# Patient Record
Sex: FEMALE | Race: WHITE | NOT HISPANIC OR LATINO | URBAN - METROPOLITAN AREA
[De-identification: names, ages, dates, MRNs, and addresses within clinical notes are randomized per-mention and may not be internally consistent; named-entity substitution may affect disease eponyms.]

---

## 2020-07-14 ENCOUNTER — OFFICE VISIT (OUTPATIENT)
Dept: URBAN - METROPOLITAN AREA CLINIC 113 | Facility: CLINIC | Age: 60
End: 2020-07-14

## 2020-07-25 ENCOUNTER — TELEPHONE ENCOUNTER (OUTPATIENT)
Dept: URBAN - METROPOLITAN AREA CLINIC 13 | Facility: CLINIC | Age: 60
End: 2020-07-25

## 2020-07-25 RX ORDER — PANTOPRAZOLE SODIUM 40 MG
TAKE 1 TABLET DAILY TABLET, DELAYED RELEASE (ENTERIC COATED) ORAL
Refills: 0 | OUTPATIENT
End: 2014-04-30

## 2020-07-26 ENCOUNTER — TELEPHONE ENCOUNTER (OUTPATIENT)
Dept: URBAN - METROPOLITAN AREA CLINIC 13 | Facility: CLINIC | Age: 60
End: 2020-07-26

## 2020-07-26 RX ORDER — DICLOFENAC SODIUM 100 MG/1
TABLET, EXTENDED RELEASE ORAL
Qty: 30 | Refills: 0 | Status: ACTIVE | COMMUNITY
Start: 2012-06-13

## 2020-07-26 RX ORDER — PANTOPRAZOLE SODIUM 40 MG/1
TABLET, DELAYED RELEASE ORAL
Qty: 30 | Refills: 0 | Status: ACTIVE | COMMUNITY
Start: 2012-08-28

## 2020-07-26 RX ORDER — CYCLOSPORINE 0.5 MG/ML
INSTILL 1 DROP IN EACH EYE TWICE DAILY EMULSION OPHTHALMIC
Refills: 0 | Status: ACTIVE | COMMUNITY

## 2020-07-26 RX ORDER — METOPROLOL SUCCINATE 25 MG/1
TABLET, EXTENDED RELEASE ORAL
Qty: 30 | Refills: 0 | Status: ACTIVE | COMMUNITY
Start: 2012-08-30

## 2020-07-26 RX ORDER — LOSARTAN POTASSIUM AND HYDROCHLOROTHIAZIDE 50; 12.5 MG/1; MG/1
TAKE 1 TABLET DAILY TABLET, FILM COATED ORAL
Refills: 0 | Status: ACTIVE | COMMUNITY
Start: 2019-08-05

## 2020-07-26 RX ORDER — PANTOPRAZOLE SODIUM 40 MG/1
TAKE 1 TABLET TWICE DAILY TABLET, DELAYED RELEASE ORAL
Qty: 180 | Refills: 3 | Status: ACTIVE | COMMUNITY
Start: 2013-07-03

## 2020-07-26 RX ORDER — VALACYCLOVIR HCL 500 MG
TAKE 1 TABLET  PRN TABLET ORAL
Refills: 0 | Status: ACTIVE | COMMUNITY

## 2020-07-26 RX ORDER — SODIUM SULFATE, POTASSIUM SULFATE, MAGNESIUM SULFATE 17.5; 3.13; 1.6 G/ML; G/ML; G/ML
5PM THE DAY BEFORE PROCEDURE, DRINK 1/2 OF PREP, THEN 6HOURS BEFORE PROCEDURE DRINK REMAINDER OF PREP SOLUTION, CONCENTRATE ORAL
Qty: 1 | Refills: 0 | Status: ACTIVE | COMMUNITY
Start: 2020-07-14

## 2020-07-26 RX ORDER — DESOXIMETASONE 2.5 MG/G
OINTMENT TOPICAL
Qty: 60 | Refills: 0 | Status: ACTIVE | COMMUNITY
Start: 2012-07-31

## 2020-07-26 RX ORDER — CARBINOXAMINE MALEATE 4 MG/1
TABLET ORAL
Qty: 30 | Refills: 0 | Status: ACTIVE | COMMUNITY
Start: 2013-02-12

## 2020-07-26 RX ORDER — LOSARTAN POTASSIUM 50 MG/1
TABLET, FILM COATED ORAL
Qty: 30 | Refills: 0 | Status: ACTIVE | COMMUNITY
Start: 2019-08-30

## 2020-07-26 RX ORDER — DOXYCYCLINE 100 MG/1
CAPSULE ORAL
Qty: 14 | Refills: 0 | Status: ACTIVE | COMMUNITY
Start: 2019-12-29

## 2020-07-26 RX ORDER — CYCLOSPORINE 0.5 MG/ML
EMULSION OPHTHALMIC
Qty: 60 | Refills: 0 | Status: ACTIVE | COMMUNITY
Start: 2012-06-13

## 2020-07-26 RX ORDER — PREDNISONE 20 MG/1
TABLET ORAL
Qty: 10 | Refills: 0 | Status: ACTIVE | COMMUNITY
Start: 2019-12-29

## 2020-07-26 RX ORDER — CIPROFLOXACIN HYDROCHLORIDE 500 MG/1
TABLET, FILM COATED ORAL
Qty: 20 | Refills: 0 | Status: ACTIVE | COMMUNITY
Start: 2019-12-06

## 2020-07-26 RX ORDER — METOPROLOL SUCCINATE 25 MG/1
TAKE 1 TABLET DAILY TABLET, EXTENDED RELEASE ORAL
Refills: 0 | Status: ACTIVE | COMMUNITY

## 2020-07-26 RX ORDER — SALICYLIC ACID 60 MG/G
CREAM TOPICAL
Qty: 255 | Refills: 0 | Status: ACTIVE | COMMUNITY
Start: 2012-07-30

## 2020-07-26 RX ORDER — EMOLLIENT COMBINATION NO.53
CREAM (GRAM) TOPICAL
Qty: 255 | Refills: 0 | Status: ACTIVE | COMMUNITY
Start: 2012-07-30

## 2020-07-26 RX ORDER — VALACYCLOVIR 1 G/1
TABLET, FILM COATED ORAL
Qty: 30 | Refills: 0 | Status: ACTIVE | COMMUNITY
Start: 2020-06-23

## 2020-07-26 RX ORDER — HYDROCHLOROTHIAZIDE 12.5 MG/1
TABLET ORAL
Qty: 30 | Refills: 0 | Status: ACTIVE | COMMUNITY
Start: 2019-08-30

## 2020-07-26 RX ORDER — BENZONATATE 200 MG/1
CAPSULE ORAL
Qty: 20 | Refills: 0 | Status: ACTIVE | COMMUNITY
Start: 2019-12-29

## 2020-07-26 RX ORDER — SULFAMETHOXAZOLE AND TRIMETHOPRIM 800; 160 MG/1; MG/1
TABLET ORAL
Qty: 6 | Refills: 0 | Status: ACTIVE | COMMUNITY
Start: 2019-11-24

## 2020-07-26 RX ORDER — MELOXICAM 15 MG/1
TABLET ORAL
Qty: 30 | Refills: 0 | Status: ACTIVE | COMMUNITY
Start: 2020-04-14

## 2020-08-03 ENCOUNTER — OFFICE VISIT (OUTPATIENT)
Dept: URBAN - METROPOLITAN AREA SURGERY CENTER 25 | Facility: SURGERY CENTER | Age: 60
End: 2020-08-03
Payer: COMMERCIAL

## 2020-08-03 ENCOUNTER — CLAIMS CREATED FROM THE CLAIM WINDOW (OUTPATIENT)
Dept: URBAN - METROPOLITAN AREA CLINIC 4 | Facility: CLINIC | Age: 60
End: 2020-08-03
Payer: COMMERCIAL

## 2020-08-03 DIAGNOSIS — K22.8 OTHER SPECIFIED DISEASES OF ESOPHAGUS: ICD-10-CM

## 2020-08-03 DIAGNOSIS — K44.9 HIATAL HERNIA: ICD-10-CM

## 2020-08-03 DIAGNOSIS — R13.10 DYSPHAGIA: ICD-10-CM

## 2020-08-03 DIAGNOSIS — K21.9 GERD: ICD-10-CM

## 2020-08-03 PROCEDURE — 43248 EGD GUIDE WIRE INSERTION: CPT | Performed by: INTERNAL MEDICINE

## 2020-08-03 PROCEDURE — 43239 EGD BIOPSY SINGLE/MULTIPLE: CPT | Performed by: INTERNAL MEDICINE

## 2020-08-03 PROCEDURE — 88305 TISSUE EXAM BY PATHOLOGIST: CPT | Performed by: PATHOLOGY

## 2020-08-17 ENCOUNTER — CLAIMS CREATED FROM THE CLAIM WINDOW (OUTPATIENT)
Dept: URBAN - METROPOLITAN AREA CLINIC 4 | Facility: CLINIC | Age: 60
End: 2020-08-17
Payer: COMMERCIAL

## 2020-08-17 ENCOUNTER — OFFICE VISIT (OUTPATIENT)
Dept: URBAN - METROPOLITAN AREA SURGERY CENTER 25 | Facility: SURGERY CENTER | Age: 60
End: 2020-08-17
Payer: COMMERCIAL

## 2020-08-17 DIAGNOSIS — K57.30 COLON, DIVERTICULOSIS: ICD-10-CM

## 2020-08-17 DIAGNOSIS — K63.89 OTHER SPECIFIED DISEASES OF INTESTINE: ICD-10-CM

## 2020-08-17 DIAGNOSIS — D12.3 ADENOMA OF TRANSVERSE COLON: ICD-10-CM

## 2020-08-17 DIAGNOSIS — D12.3 BENIGN NEOPLASM OF TRANSVERSE COLON: ICD-10-CM

## 2020-08-17 DIAGNOSIS — Z12.11 COLON CANCER SCREENING: ICD-10-CM

## 2020-08-17 DIAGNOSIS — K64.0 GRADE I INTERNAL HEMORRHOIDS: ICD-10-CM

## 2020-08-17 PROCEDURE — 45385 COLONOSCOPY W/LESION REMOVAL: CPT | Performed by: INTERNAL MEDICINE

## 2020-08-17 PROCEDURE — 88305 TISSUE EXAM BY PATHOLOGIST: CPT | Performed by: PATHOLOGY

## 2020-08-17 PROCEDURE — 45380 COLONOSCOPY AND BIOPSY: CPT | Performed by: INTERNAL MEDICINE

## 2020-10-20 ENCOUNTER — OFFICE VISIT (OUTPATIENT)
Dept: URBAN - METROPOLITAN AREA CLINIC 113 | Facility: CLINIC | Age: 60
End: 2020-10-20
Payer: COMMERCIAL

## 2020-10-20 ENCOUNTER — DASHBOARD ENCOUNTERS (OUTPATIENT)
Age: 60
End: 2020-10-20

## 2020-10-20 VITALS
RESPIRATION RATE: 20 BRPM | HEIGHT: 69 IN | DIASTOLIC BLOOD PRESSURE: 84 MMHG | TEMPERATURE: 99.3 F | WEIGHT: 263 LBS | BODY MASS INDEX: 38.95 KG/M2 | SYSTOLIC BLOOD PRESSURE: 141 MMHG | HEART RATE: 57 BPM

## 2020-10-20 DIAGNOSIS — K58.0 IRRITABLE BOWEL SYNDROME WITH DIARRHEA: ICD-10-CM

## 2020-10-20 DIAGNOSIS — K21.9 GASTROESOPHAGEAL REFLUX DISEASE WITHOUT ESOPHAGITIS: ICD-10-CM

## 2020-10-20 DIAGNOSIS — Z86.010 HISTORY OF COLON POLYPS: ICD-10-CM

## 2020-10-20 PROCEDURE — 99213 OFFICE O/P EST LOW 20 MIN: CPT | Performed by: INTERNAL MEDICINE

## 2020-10-20 RX ORDER — VALACYCLOVIR HCL 500 MG
TAKE 1 TABLET  PRN TABLET ORAL
Refills: 0 | Status: ACTIVE | COMMUNITY

## 2020-10-20 RX ORDER — METOPROLOL SUCCINATE 25 MG/1
TAKE 1 TABLET DAILY TABLET, EXTENDED RELEASE ORAL
Refills: 0 | Status: ACTIVE | COMMUNITY

## 2020-10-20 RX ORDER — WHEAT DEXTRIN 3 G/4 G
1 TEAS POWDER (GRAM) ORAL DAILY
Status: ACTIVE | COMMUNITY

## 2020-10-20 RX ORDER — PANTOPRAZOLE SODIUM 40 MG/1
TABLET, DELAYED RELEASE ORAL
Qty: 30 | Refills: 0 | Status: ACTIVE | COMMUNITY
Start: 2012-08-28

## 2020-10-20 RX ORDER — CYCLOSPORINE 0.5 MG/ML
INSTILL 1 DROP IN EACH EYE TWICE DAILY EMULSION OPHTHALMIC
Refills: 0 | Status: ACTIVE | COMMUNITY

## 2020-10-20 RX ORDER — LOSARTAN POTASSIUM AND HYDROCHLOROTHIAZIDE 50; 12.5 MG/1; MG/1
TAKE 1 TABLET DAILY TABLET, FILM COATED ORAL
Refills: 0 | Status: ACTIVE | COMMUNITY
Start: 2019-08-05

## 2020-10-20 NOTE — HPI-TODAY'S VISIT:
Discussion/Summary The patient's lower GI tract symptoms in  my estimation are most consistent with irritable bowel syndrome. However, she's never had a colonoscopy and this needs to be undertaken. In the interim, I would attempt to relieve her symptoms with the use of a fiber supplement. The patient's reflux complaints are suboptimally controlled, and likely are contributing to her dysphagia. She also did respond previously to empiric esophageal dilatation.    Plan 1. Schedule patient for upper GI endoscopy with dilatation 2. Schedule patient for initial screening colonoscopy 3. Begin fiber supplementation with Benefiber one tablespoonful by mouth twice a day 4. Increase pantoprazole to 40 mg by mouth twice a day 5. Follow up after upper and lower endoscopy Patient underwent upper GI endoscopy on August 3, 2020 which showed a hiatal hernia.  She was empirically dilated with a 45 Syrian savory dilator at that time.  Biopsies of the gastroesophageal junction were negative for Ingram's, and only showed gastric mucosa. The patient's colonoscopy was done on August 17th, 2020.  This showed left colon diverticulosis, and a 7 mm transverse colon adenoma.  She also had grade 1 internal hemorrhoids.  Random colon biopsies were negative for pathology. The patient still has rare diarrhea, once a week, on Benefiber.  Symptoms are increased with stress.  Benefiber helps.  She has described multiple food associations, and is keeping a food and symptom diary to better delineate these. Her swallowing is much better.  She is having heartburn 3-4 times per week despite twice daily Protonix.  She never used domperidone or Reglan.  She states that the hiatal hernia "is a problem."  She is concerned about the possibility of aspirating, as she is having frequent breakthrough reflux symptoms.

## 2020-10-20 NOTE — HPI-OTHER HISTORIES
Al Stephens is a 59-year-old female who I previously saw in 2013. At that time, she had complaints of dysphagia.She was diagnosed as having esophageal spasm 22 years ago by a physician in [place] who prescribed Nexium 40 mg daily with which she was compliant. In 2010, she experienced a meat impaction requiring emergent EGD. Afterward, she was prescribed Protonix and, due to persistent indigestion, ranitidine was added. At the time of her last visit in July 2013, she was having no further problems with indigestion or heartburn. Approximately once or twice a month, she had solid food dysphagia describing material becoming lodged in her chest relieved with time. 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 No gross strictures were seen. Empiric dilatation was undertaken with a 44 Bengali Skinner dilator. The patient had no subsequent complaints of dysphagia after this. However, over the last 2 years, the patient has had crampy abdominal pain and bloating, possibly associated with ingestion of milk products. She denies any fever, chills, sweats, rectal bleeding, etc. She does have also a pattern of alternating constipation and diarrhea over that 2 year span. This is a new bowel habit pattern for her. She may have a watery bowel movement in the morning and a hard stool in afternoon. She has still not had a screening colonoscopy. The patient has had intermittent solid food dysphagia over the last 3 months while in quadrant pain. She is taking Protonix daily. This is most prominent with meat, such as chicken. She has occasional heartburn.

## 2020-10-22 PROBLEM — 197125005: Status: ACTIVE | Noted: 2020-10-20

## 2020-10-22 PROBLEM — 428283002: Status: ACTIVE | Noted: 2020-10-20

## 2020-10-22 PROBLEM — 266435005: Status: ACTIVE | Noted: 2020-10-20

## 2025-06-13 ENCOUNTER — LAB OUTSIDE AN ENCOUNTER (OUTPATIENT)
Dept: URBAN - METROPOLITAN AREA SURGERY CENTER 25 | Facility: SURGERY CENTER | Age: 65
End: 2025-06-13

## 2025-06-16 ENCOUNTER — CLAIMS CREATED FROM THE CLAIM WINDOW (OUTPATIENT)
Dept: URBAN - METROPOLITAN AREA SURGERY CENTER 25 | Facility: SURGERY CENTER | Age: 65
End: 2025-06-16
Payer: COMMERCIAL

## 2025-06-16 DIAGNOSIS — Z12.11 ENCOUNTER FOR SCREENING FOR MALIGNANT NEOPLASM OF COLON: ICD-10-CM

## 2025-06-16 DIAGNOSIS — Z86.0100 PERSONAL HISTORY OF COLON POLYPS, UNSPECIFIED: ICD-10-CM

## 2025-06-16 DIAGNOSIS — I10 ESSENTIAL HYPERTENSION: ICD-10-CM

## 2025-06-16 PROCEDURE — G0105 COLORECTAL SCRN; HI RISK IND: HCPCS | Performed by: INTERNAL MEDICINE

## 2025-06-16 PROCEDURE — 0529F INTRVL 3/>YR PTS CLNSCP DOCD: CPT | Performed by: INTERNAL MEDICINE

## 2025-06-16 PROCEDURE — 00812 ANES LWR INTST SCR COLSC: CPT | Performed by: ANESTHESIOLOGY

## 2025-06-16 PROCEDURE — 00812 ANES LWR INTST SCR COLSC: CPT | Performed by: NURSE ANESTHETIST, CERTIFIED REGISTERED

## 2025-06-16 RX ORDER — WHEAT DEXTRIN 3 G/4 G
1 TEAS POWDER (GRAM) ORAL DAILY
Status: ACTIVE | COMMUNITY

## 2025-06-16 RX ORDER — LOSARTAN POTASSIUM AND HYDROCHLOROTHIAZIDE 50; 12.5 MG/1; MG/1
TAKE 1 TABLET DAILY TABLET, FILM COATED ORAL
Refills: 0 | Status: ACTIVE | COMMUNITY
Start: 2019-08-05

## 2025-06-16 RX ORDER — VALACYCLOVIR HCL 500 MG
TAKE 1 TABLET  PRN TABLET ORAL
Refills: 0 | Status: ACTIVE | COMMUNITY

## 2025-06-16 RX ORDER — CYCLOSPORINE 0.5 MG/ML
INSTILL 1 DROP IN EACH EYE TWICE DAILY EMULSION OPHTHALMIC
Refills: 0 | Status: ACTIVE | COMMUNITY

## 2025-06-16 RX ORDER — METOPROLOL SUCCINATE 25 MG/1
TAKE 1 TABLET DAILY TABLET, EXTENDED RELEASE ORAL
Refills: 0 | Status: ACTIVE | COMMUNITY

## 2025-06-16 RX ORDER — PANTOPRAZOLE SODIUM 40 MG/1
TABLET, DELAYED RELEASE ORAL
Qty: 30 | Refills: 0 | Status: ACTIVE | COMMUNITY
Start: 2012-08-28